# Patient Record
Sex: FEMALE | Race: WHITE | NOT HISPANIC OR LATINO | Employment: FULL TIME | URBAN - METROPOLITAN AREA
[De-identification: names, ages, dates, MRNs, and addresses within clinical notes are randomized per-mention and may not be internally consistent; named-entity substitution may affect disease eponyms.]

---

## 2023-02-09 ENCOUNTER — EH NON-PROVIDER (OUTPATIENT)
Dept: OCCUPATIONAL MEDICINE | Facility: CLINIC | Age: 23
End: 2023-02-09

## 2023-02-09 ENCOUNTER — EMPLOYEE HEALTH (OUTPATIENT)
Dept: OCCUPATIONAL MEDICINE | Facility: CLINIC | Age: 23
End: 2023-02-09

## 2023-02-09 ENCOUNTER — HOSPITAL ENCOUNTER (OUTPATIENT)
Facility: MEDICAL CENTER | Age: 23
End: 2023-02-09
Attending: PREVENTIVE MEDICINE
Payer: COMMERCIAL

## 2023-02-09 DIAGNOSIS — Z02.1 PRE-EMPLOYMENT DRUG SCREENING: ICD-10-CM

## 2023-02-09 DIAGNOSIS — Z02.89 ENCOUNTER FOR OCCUPATIONAL HEALTH ASSESSMENT: ICD-10-CM

## 2023-02-09 LAB
AMP AMPHETAMINE: NORMAL
BAR BARBITURATES: NORMAL
BZO BENZODIAZEPINES: NORMAL
COC COCAINE: NORMAL
INT CON NEG: NORMAL
INT CON POS: NORMAL
MDMA ECSTASY: NORMAL
MET METHAMPHETAMINES: NORMAL
MTD METHADONE: NORMAL
OPI OPIATES: NORMAL
OXY OXYCODONE: NORMAL
PCP PHENCYCLIDINE: NORMAL
POC URINE DRUG SCREEN OCDRS: NEGATIVE
THC: NORMAL

## 2023-02-09 PROCEDURE — 94375 RESPIRATORY FLOW VOLUME LOOP: CPT | Performed by: NURSE PRACTITIONER

## 2023-02-09 PROCEDURE — 86480 TB TEST CELL IMMUN MEASURE: CPT | Performed by: NURSE PRACTITIONER

## 2023-02-09 PROCEDURE — 80305 DRUG TEST PRSMV DIR OPT OBS: CPT | Performed by: NURSE PRACTITIONER

## 2023-02-09 PROCEDURE — 8915 PR COMPREHENSIVE PHYSICAL: Performed by: PREVENTIVE MEDICINE

## 2023-02-10 LAB
GAMMA INTERFERON BACKGROUND BLD IA-ACNC: 0.03 IU/ML
M TB IFN-G BLD-IMP: NEGATIVE
M TB IFN-G CD4+ BCKGRND COR BLD-ACNC: 0 IU/ML
MITOGEN IGNF BCKGRD COR BLD-ACNC: >10 IU/ML
QFT TB2 - NIL TBQ2: 0 IU/ML

## 2023-02-17 ENCOUNTER — EH NON-PROVIDER (OUTPATIENT)
Dept: OCCUPATIONAL MEDICINE | Facility: CLINIC | Age: 23
End: 2023-02-17

## 2023-04-15 ENCOUNTER — OFFICE VISIT (OUTPATIENT)
Dept: URGENT CARE | Facility: CLINIC | Age: 23
End: 2023-04-15
Payer: COMMERCIAL

## 2023-04-15 ENCOUNTER — HOSPITAL ENCOUNTER (OUTPATIENT)
Facility: MEDICAL CENTER | Age: 23
End: 2023-04-15
Payer: COMMERCIAL

## 2023-04-15 VITALS
HEIGHT: 67 IN | DIASTOLIC BLOOD PRESSURE: 58 MMHG | HEART RATE: 97 BPM | SYSTOLIC BLOOD PRESSURE: 110 MMHG | TEMPERATURE: 96.8 F | RESPIRATION RATE: 16 BRPM | WEIGHT: 143 LBS | BODY MASS INDEX: 22.44 KG/M2 | OXYGEN SATURATION: 99 %

## 2023-04-15 DIAGNOSIS — B37.31 VULVOVAGINAL CANDIDIASIS: ICD-10-CM

## 2023-04-15 DIAGNOSIS — N89.8 FOUL SMELLING VAGINAL DISCHARGE: ICD-10-CM

## 2023-04-15 LAB
APPEARANCE UR: NORMAL
BILIRUB UR STRIP-MCNC: NEGATIVE MG/DL
COLOR UR AUTO: NORMAL
GLUCOSE UR STRIP.AUTO-MCNC: NEGATIVE MG/DL
KETONES UR STRIP.AUTO-MCNC: NEGATIVE MG/DL
LEUKOCYTE ESTERASE UR QL STRIP.AUTO: NORMAL
NITRITE UR QL STRIP.AUTO: NEGATIVE
PH UR STRIP.AUTO: 5.5 [PH] (ref 5–8)
POCT INT CON NEG: NEGATIVE
POCT INT CON POS: POSITIVE
POCT URINE PREGNANCY TEST: NEGATIVE
PROT UR QL STRIP: NEGATIVE MG/DL
RBC UR QL AUTO: NEGATIVE
SP GR UR STRIP.AUTO: 1.03
UROBILINOGEN UR STRIP-MCNC: 0.2 MG/DL

## 2023-04-15 PROCEDURE — 87491 CHLMYD TRACH DNA AMP PROBE: CPT

## 2023-04-15 PROCEDURE — 87591 N.GONORRHOEAE DNA AMP PROB: CPT

## 2023-04-15 PROCEDURE — 81002 URINALYSIS NONAUTO W/O SCOPE: CPT

## 2023-04-15 PROCEDURE — 87660 TRICHOMONAS VAGIN DIR PROBE: CPT

## 2023-04-15 PROCEDURE — 87510 GARDNER VAG DNA DIR PROBE: CPT

## 2023-04-15 PROCEDURE — 81025 URINE PREGNANCY TEST: CPT

## 2023-04-15 PROCEDURE — 87086 URINE CULTURE/COLONY COUNT: CPT

## 2023-04-15 PROCEDURE — 99203 OFFICE O/P NEW LOW 30 MIN: CPT

## 2023-04-15 PROCEDURE — 87480 CANDIDA DNA DIR PROBE: CPT

## 2023-04-15 RX ORDER — LEVOTHYROXINE SODIUM 0.05 MG/1
50 TABLET ORAL
COMMUNITY

## 2023-04-15 RX ORDER — METRONIDAZOLE 500 MG/1
500 TABLET ORAL 2 TIMES DAILY
Qty: 14 TABLET | Refills: 0 | Status: SHIPPED | OUTPATIENT
Start: 2023-04-15 | End: 2023-04-22

## 2023-04-15 RX ORDER — FLUCONAZOLE 150 MG/1
150 TABLET ORAL DAILY
Qty: 1 TABLET | Refills: 1 | Status: SHIPPED | OUTPATIENT
Start: 2023-04-15 | End: 2023-05-01

## 2023-04-15 ASSESSMENT — ENCOUNTER SYMPTOMS
CHILLS: 0
VOMITING: 0
NAUSEA: 0
FLANK PAIN: 0
ABDOMINAL PAIN: 0
FEVER: 0

## 2023-04-15 NOTE — LETTER
April 15, 2023    To Whom It May Concern:         This is confirmation that Blanca DOUGLAS Yuki attended her scheduled appointment with XIOMARA Balbuena on 4/15/23. Please excuse her from work 4/16/23.         If you have any questions please do not hesitate to call me at the phone number listed below.    Sincerely,          DARLING Balbuena.  [phone]

## 2023-04-16 DIAGNOSIS — N89.8 FOUL SMELLING VAGINAL DISCHARGE: ICD-10-CM

## 2023-04-16 LAB
CANDIDA DNA VAG QL PROBE+SIG AMP: POSITIVE
G VAGINALIS DNA VAG QL PROBE+SIG AMP: POSITIVE
T VAGINALIS DNA VAG QL PROBE+SIG AMP: NEGATIVE

## 2023-04-16 NOTE — PROGRESS NOTES
"Subjective:   Blanca Mirza is a 23 y.o. female who presents for Vaginal Pain (X 2 DAYS, VAGINAL DISCOMFORT  )      HPI: This is a 23-year-old female who presents today for vaginal discomfort and discharge.  Patient reports symptoms developed 2 days ago.  Patient reports foul-smelling vaginal discharge.  She reports associated vaginal itching.  She has not taken anything for her symptoms.  She is sexually active with 1 partner.  She denies abdominal pain, pelvic pain, fevers, chills, body aches.  She denies urinary symptoms.        Review of Systems   Constitutional:  Negative for chills, fever and malaise/fatigue.   Gastrointestinal:  Negative for abdominal pain, nausea and vomiting.   Genitourinary:  Negative for dysuria, flank pain, frequency, hematuria and urgency.     Medications:    Current Outpatient Medications on File Prior to Visit   Medication Sig Dispense Refill    levothyroxine (SYNTHROID) 50 MCG Tab Take 50 mcg by mouth every morning on an empty stomach.      sertraline (ZOLOFT) 50 MG Tab Take 75 mg by mouth every day.      PREBIOTIC PRODUCT PO Take  by mouth.      Probiotic Product (PROBIOTIC-10 PO) Take  by mouth.       No current facility-administered medications on file prior to visit.        Allergies:   Patient has no known allergies.    Problem List:   There is no problem list on file for this patient.       Surgical History:  No past surgical history on file.    Past Social Hx:   Social History     Tobacco Use    Smoking status: Never    Smokeless tobacco: Never   Vaping Use    Vaping Use: Never used   Substance Use Topics    Alcohol use: Yes     Alcohol/week: 2.4 oz     Types: 4 Standard drinks or equivalent per week    Drug use: Never          Problem list, medications, and allergies reviewed by myself today in Epic.     Objective:     Resp 16   Ht 1.702 m (5' 7\")   Wt 64.9 kg (143 lb)   BMI 22.40 kg/m²     Physical Exam  Vitals and nursing note reviewed.   Constitutional:       General: " She is not in acute distress.     Appearance: Normal appearance. She is normal weight. She is not ill-appearing, toxic-appearing or diaphoretic.   HENT:      Head: Normocephalic and atraumatic.   Cardiovascular:      Rate and Rhythm: Normal rate and regular rhythm.      Pulses: Normal pulses.      Heart sounds: Normal heart sounds. No murmur heard.    No friction rub. No gallop.   Pulmonary:      Effort: Pulmonary effort is normal. No respiratory distress.      Breath sounds: Normal breath sounds. No stridor. No wheezing, rhonchi or rales.   Chest:      Chest wall: No tenderness.   Genitourinary:     Comments: Deferred  Skin:     General: Skin is warm and dry.      Capillary Refill: Capillary refill takes less than 2 seconds.   Neurological:      General: No focal deficit present.      Mental Status: She is alert and oriented to person, place, and time. Mental status is at baseline.      Gait: Gait normal.   Psychiatric:         Mood and Affect: Mood normal.         Behavior: Behavior normal.         Thought Content: Thought content normal.         Judgment: Judgment normal.       Assessment/Plan:     1. Vulvovaginal candidiasis  fluconazole (DIFLUCAN) 150 MG tablet      2. Foul smelling vaginal discharge  POCT Urinalysis    POCT PREGNANCY    URINE CULTURE(NEW)    VAGINAL PATHOGENS DNA PANEL    Chlamydia/GC, PCR (Genital/Anal swab)    metroNIDAZOLE (FLAGYL) 500 MG Tab         Results for orders placed or performed in visit on 04/15/23   POCT Urinalysis   Result Value Ref Range    POC Color Dark Yellow Negative    POC Appearance Cloudy Negative    POC Glucose Negative Negative mg/dL    POC Bilirubin Negative Negative mg/dL    POC Ketones Negative Negative mg/dL    POC Specific Gravity 1.030 <1.005 - >1.030    POC Blood Negative Negative    POC Urine PH 5.5 5.0 - 8.0    POC Protein Negative Negative mg/dL    POC Urobiligen 0.2 Negative (0.2) mg/dL    POC Nitrites Negative Negative    POC Leukocyte Esterase Small  Negative   POCT PREGNANCY   Result Value Ref Range    POC Urine Pregnancy Test Negative     Internal Control Positive Positive     Internal Control Negative Negative           Comments/MDM:   Pt is clinically stable at today's acute urgent care visit.  No acute distress noted. Appropriate for outpatient management at this time.   Acute problem.  Patient presents today for vaginal discharge, vaginal itching, and foul-smelling vaginal discharge.  Patient will be treated empirically for bacterial vaginosis and vulvovaginal candidiasis with 7-day course of metronidazole and one-time dose of Diflucan.  Vaginal pathogen swab and GC chlamydia swab obtained today, will send to lab and follow-up.  Urine will be sent for culture, will follow. Patient is to return to UC or go to ER for any new or worsening signs or symptoms, and follow with with PCP for recheck. Patient is agreeable with plan of care and verbalizes good understanding.             Discussed DDx, management options (risks,benefits, and alternatives to planned treatment), natural progression and supportive care.  Expressed understanding and the treatment plan was agreed upon. Questions were encouraged and answered   Return to urgent care prn if new or worsening sx or if there is no improvement in condition prn.    Educated in Red flags and indications to immediately call 911 or present to the Emergency Department.   Advised the patient to follow-up with the primary care physician for recheck, reevaluation, and consideration of further management.    I personally reviewed prior external notes and test results pertinent to today's visit.  I have independently reviewed and interpreted all diagnostics ordered during this urgent care acute visit.         Please note that this dictation was created using voice recognition software. I have made a reasonable attempt to correct obvious errors, but I expect that there are errors of grammar and possibly content that I did  not discover before finalizing the note.    This note was electronically signed by FERNANDO Smith

## 2023-04-18 LAB
BACTERIA UR CULT: NORMAL
C TRACH DNA GENITAL QL NAA+PROBE: NEGATIVE
N GONORRHOEA DNA GENITAL QL NAA+PROBE: NEGATIVE
SIGNIFICANT IND 70042: NORMAL
SITE SITE: NORMAL
SOURCE SOURCE: NORMAL
SPECIMEN SOURCE: NORMAL

## 2023-04-30 ENCOUNTER — APPOINTMENT (OUTPATIENT)
Dept: RADIOLOGY | Facility: MEDICAL CENTER | Age: 23
End: 2023-04-30
Attending: EMERGENCY MEDICINE
Payer: COMMERCIAL

## 2023-04-30 ENCOUNTER — HOSPITAL ENCOUNTER (EMERGENCY)
Facility: MEDICAL CENTER | Age: 23
End: 2023-04-30
Attending: EMERGENCY MEDICINE
Payer: COMMERCIAL

## 2023-04-30 VITALS
HEART RATE: 92 BPM | RESPIRATION RATE: 18 BRPM | OXYGEN SATURATION: 97 % | WEIGHT: 144.62 LBS | BODY MASS INDEX: 22.7 KG/M2 | SYSTOLIC BLOOD PRESSURE: 110 MMHG | TEMPERATURE: 98 F | DIASTOLIC BLOOD PRESSURE: 55 MMHG | HEIGHT: 67 IN

## 2023-04-30 DIAGNOSIS — J02.9 PHARYNGITIS, UNSPECIFIED ETIOLOGY: ICD-10-CM

## 2023-04-30 LAB
ANION GAP SERPL CALC-SCNC: 11 MMOL/L (ref 7–16)
BASOPHILS # BLD AUTO: 0.2 % (ref 0–1.8)
BASOPHILS # BLD: 0.03 K/UL (ref 0–0.12)
BUN SERPL-MCNC: 15 MG/DL (ref 8–22)
CALCIUM SERPL-MCNC: 9 MG/DL (ref 8.5–10.5)
CHLORIDE SERPL-SCNC: 102 MMOL/L (ref 96–112)
CO2 SERPL-SCNC: 24 MMOL/L (ref 20–33)
CREAT SERPL-MCNC: 0.63 MG/DL (ref 0.5–1.4)
EOSINOPHIL # BLD AUTO: 0.04 K/UL (ref 0–0.51)
EOSINOPHIL NFR BLD: 0.3 % (ref 0–6.9)
ERYTHROCYTE [DISTWIDTH] IN BLOOD BY AUTOMATED COUNT: 42.3 FL (ref 35.9–50)
GFR SERPLBLD CREATININE-BSD FMLA CKD-EPI: 128 ML/MIN/1.73 M 2
GLUCOSE SERPL-MCNC: 111 MG/DL (ref 65–99)
HCG SERPL QL: NEGATIVE
HCT VFR BLD AUTO: 42.3 % (ref 37–47)
HGB BLD-MCNC: 14 G/DL (ref 12–16)
IMM GRANULOCYTES # BLD AUTO: 0.07 K/UL (ref 0–0.11)
IMM GRANULOCYTES NFR BLD AUTO: 0.5 % (ref 0–0.9)
LYMPHOCYTES # BLD AUTO: 1.22 K/UL (ref 1–4.8)
LYMPHOCYTES NFR BLD: 8.9 % (ref 22–41)
MCH RBC QN AUTO: 31.2 PG (ref 27–33)
MCHC RBC AUTO-ENTMCNC: 33.1 G/DL (ref 33.6–35)
MCV RBC AUTO: 94.2 FL (ref 81.4–97.8)
MONOCYTES # BLD AUTO: 0.63 K/UL (ref 0–0.85)
MONOCYTES NFR BLD AUTO: 4.6 % (ref 0–13.4)
NEUTROPHILS # BLD AUTO: 11.65 K/UL (ref 2–7.15)
NEUTROPHILS NFR BLD: 85.5 % (ref 44–72)
NRBC # BLD AUTO: 0 K/UL
NRBC BLD-RTO: 0 /100 WBC
PLATELET # BLD AUTO: 176 K/UL (ref 164–446)
PMV BLD AUTO: 9.6 FL (ref 9–12.9)
POTASSIUM SERPL-SCNC: 3.9 MMOL/L (ref 3.6–5.5)
RBC # BLD AUTO: 4.49 M/UL (ref 4.2–5.4)
S PYO DNA SPEC NAA+PROBE: NOT DETECTED
SODIUM SERPL-SCNC: 137 MMOL/L (ref 135–145)
WBC # BLD AUTO: 13.6 K/UL (ref 4.8–10.8)

## 2023-04-30 PROCEDURE — 700111 HCHG RX REV CODE 636 W/ 250 OVERRIDE (IP): Performed by: EMERGENCY MEDICINE

## 2023-04-30 PROCEDURE — 70491 CT SOFT TISSUE NECK W/DYE: CPT

## 2023-04-30 PROCEDURE — 87651 STREP A DNA AMP PROBE: CPT

## 2023-04-30 PROCEDURE — 96375 TX/PRO/DX INJ NEW DRUG ADDON: CPT

## 2023-04-30 PROCEDURE — 80048 BASIC METABOLIC PNL TOTAL CA: CPT

## 2023-04-30 PROCEDURE — 84703 CHORIONIC GONADOTROPIN ASSAY: CPT

## 2023-04-30 PROCEDURE — 99284 EMERGENCY DEPT VISIT MOD MDM: CPT

## 2023-04-30 PROCEDURE — 36415 COLL VENOUS BLD VENIPUNCTURE: CPT

## 2023-04-30 PROCEDURE — 85025 COMPLETE CBC W/AUTO DIFF WBC: CPT

## 2023-04-30 PROCEDURE — A9270 NON-COVERED ITEM OR SERVICE: HCPCS | Performed by: EMERGENCY MEDICINE

## 2023-04-30 PROCEDURE — 700117 HCHG RX CONTRAST REV CODE 255: Performed by: EMERGENCY MEDICINE

## 2023-04-30 PROCEDURE — 96374 THER/PROPH/DIAG INJ IV PUSH: CPT

## 2023-04-30 PROCEDURE — 700102 HCHG RX REV CODE 250 W/ 637 OVERRIDE(OP): Performed by: EMERGENCY MEDICINE

## 2023-04-30 RX ORDER — OXYCODONE HYDROCHLORIDE 5 MG/1
5 TABLET ORAL ONCE
Status: COMPLETED | OUTPATIENT
Start: 2023-04-30 | End: 2023-04-30

## 2023-04-30 RX ORDER — HYDROCODONE BITARTRATE AND ACETAMINOPHEN 5; 325 MG/1; MG/1
1 TABLET ORAL EVERY 4 HOURS PRN
Qty: 10 TABLET | Refills: 0 | Status: SHIPPED | OUTPATIENT
Start: 2023-04-30 | End: 2023-05-02

## 2023-04-30 RX ORDER — ONDANSETRON 2 MG/ML
4 INJECTION INTRAMUSCULAR; INTRAVENOUS ONCE
Status: COMPLETED | OUTPATIENT
Start: 2023-04-30 | End: 2023-04-30

## 2023-04-30 RX ADMIN — OXYCODONE 5 MG: 5 TABLET ORAL at 06:31

## 2023-04-30 RX ADMIN — IOHEXOL 80 ML: 350 INJECTION, SOLUTION INTRAVENOUS at 05:45

## 2023-04-30 RX ADMIN — LIDOCAINE HYDROCHLORIDE 15 ML: 20 SOLUTION OROPHARYNGEAL at 06:32

## 2023-04-30 RX ADMIN — FENTANYL CITRATE 50 MCG: 50 INJECTION, SOLUTION INTRAMUSCULAR; INTRAVENOUS at 04:29

## 2023-04-30 RX ADMIN — ONDANSETRON 4 MG: 2 INJECTION INTRAMUSCULAR; INTRAVENOUS at 04:26

## 2023-04-30 ASSESSMENT — PAIN DESCRIPTION - PAIN TYPE
TYPE: ACUTE PAIN

## 2023-04-30 NOTE — ED NOTES
Pt rounded with unlabored respirations. Vital signs is stable. Start to complain again of pain in throat. . Gurney in low position, side rail up for pt safety. Call light within reach. Will continue to monitor for any complications.

## 2023-04-30 NOTE — ED NOTES
Pt ambulatory back to room Blue 17 with steady gait. Pt placed into gown and placed on the monitor.  Pt complaints severe sore throat  since yesterday and nauseated today.    Alert and oriented not in any form of acute respiratory distress.

## 2023-04-30 NOTE — ED TRIAGE NOTES
"Chief Complaint   Patient presents with    Sore Throat     PATIENT STATES PAIN ON THE RIGHT SIDE OF THROAT, PT STATES PAIN STARTED AROUND 1800 LAST NIGHT. PT TOOK MOTRIN AND TYLENOL WITH NO RELIEF.  PATIENT STATES IT IS HARD TO SWALLOW.            PT AMBULATORY  TO TRIAGE. Pt AA&O X 4, SEE ABOVE.     PT TO LOBBY . PT EDUCATED ON ALERTING STAFF TO CHANGES IN CONDITION. PT VERBALIZED AN UNDERSTANDING.     /64   Pulse 65   Temp 36.6 °C (97.8 °F) (Temporal)   Resp 20   Ht 1.702 m (5' 7\")   Wt 65.6 kg (144 lb 10 oz)   SpO2 99%   BMI 22.65 kg/m²     "

## 2023-04-30 NOTE — ED NOTES
Checked on bed, connected to monitor,  with unlabored respirations. Vital signs is stable. Denied any new complaints. Gurney in low position, side rail up for pt safety. Call light within reach. Will continue to monitor for any complications..

## 2023-04-30 NOTE — ED PROVIDER NOTES
"ED Provider Note    CHIEF COMPLAINT  Chief Complaint   Patient presents with    Sore Throat     PATIENT STATES PAIN ON THE RIGHT SIDE OF THROAT, PT STATES PAIN STARTED AROUND 1800 LAST NIGHT. PT TOOK MOTRIN AND TYLENOL WITH NO RELIEF.  PATIENT STATES IT IS HARD TO SWALLOW.        EXTERNAL RECORDS REVIEWED  Outpatient Notes office visit April 15, 2023 for vaginal discharge diagnosed with candidal yeast infection    HPI/ROS  LIMITATION TO HISTORY   Select: : None  OUTSIDE HISTORIAN(S):  Significant other at bedside    Blanca Mirza is a 23 y.o. female who presents to the emergency department with roughly 10 hours of sore throat with right-sided predominance.  Past medical history significant only for hypothyroidism on thyroid supplementation.  Additionally on antidepressant.    This evening had relatively quick onset of right-sided sore throat feeling deep in nature.  Unrelenting pain at least moderate severity.  She took 3 doses of 400 mg of ibuprofen as well as a few doses of Tylenol with no pain abatement.  Increased pain with swallowing.    Does note that she had \"aggressive oral sex earlier in the day \".  (Yesterday)    Denies any other symptomatology.  Does have a history of strep throat.    PAST MEDICAL HISTORY   has a past medical history of Hypothyroid.    SURGICAL HISTORY  patient denies any surgical history    FAMILY HISTORY  History reviewed. No pertinent family history.    SOCIAL HISTORY  Social History     Tobacco Use    Smoking status: Never    Smokeless tobacco: Never   Vaping Use    Vaping Use: Never used   Substance and Sexual Activity    Alcohol use: Yes     Alcohol/week: 2.4 oz     Types: 4 Standard drinks or equivalent per week    Drug use: Never    Sexual activity: Not on file       CURRENT MEDICATIONS  Home Medications       Reviewed by Mary Morin R.N. (Registered Nurse) on 04/30/23 at 0309  Med List Status: Not Addressed     Medication Last Dose Status   fluconazole (DIFLUCAN) 150 MG " "tablet  Active   levothyroxine (SYNTHROID) 50 MCG Tab  Active   PREBIOTIC PRODUCT PO  Active   Probiotic Product (PROBIOTIC-10 PO)  Active   sertraline (ZOLOFT) 50 MG Tab  Active                    ALLERGIES  No Known Allergies    PHYSICAL EXAM  VITAL SIGNS: /52   Pulse 82   Temp 36.9 °C (98.4 °F) (Temporal)   Resp 18   Ht 1.702 m (5' 7\")   Wt 65.6 kg (144 lb 10 oz)   SpO2 98%   BMI 22.65 kg/m²      Pulse ox interpretation: I interpret this pulse ox as normal.  Constitutional: Alert in no apparent distress.  HENT: No signs of trauma, Bilateral external ears normal, Nose normal.   Eyes: Pupils are equal and reactive  Neck: Normal range of motion, No tenderness, Supple  Cardiovascular: Regular rate and rhythm, no murmurs.   Thorax & Lungs: Normal breath sounds, No respiratory distress, No wheezing, No chest tenderness.   Abdomen: Bowel sounds normal, Soft, No tenderness  Skin: Warm, Dry, No erythema, No rash.   Extremities: Intact distal pulses, No edema, No tenderness  Musculoskeletal: Good range of motion in all major joints. No tenderness to palpation or major deformities noted.   Neurologic: Alert , Normal motor function, Normal sensory function, No focal deficits noted.   Psychiatric: Affect normal, Judgment normal, Mood normal.         DIAGNOSTIC STUDIES / PROCEDURES    LABS  Results for orders placed or performed during the hospital encounter of 04/30/23   CBC WITH DIFFERENTIAL   Result Value Ref Range    WBC 13.6 (H) 4.8 - 10.8 K/uL    RBC 4.49 4.20 - 5.40 M/uL    Hemoglobin 14.0 12.0 - 16.0 g/dL    Hematocrit 42.3 37.0 - 47.0 %    MCV 94.2 81.4 - 97.8 fL    MCH 31.2 27.0 - 33.0 pg    MCHC 33.1 (L) 33.6 - 35.0 g/dL    RDW 42.3 35.9 - 50.0 fL    Platelet Count 176 164 - 446 K/uL    MPV 9.6 9.0 - 12.9 fL    Neutrophils-Polys 85.50 (H) 44.00 - 72.00 %    Lymphocytes 8.90 (L) 22.00 - 41.00 %    Monocytes 4.60 0.00 - 13.40 %    Eosinophils 0.30 0.00 - 6.90 %    Basophils 0.20 0.00 - 1.80 %    Immature " Granulocytes 0.50 0.00 - 0.90 %    Nucleated RBC 0.00 /100 WBC    Neutrophils (Absolute) 11.65 (H) 2.00 - 7.15 K/uL    Lymphs (Absolute) 1.22 1.00 - 4.80 K/uL    Monos (Absolute) 0.63 0.00 - 0.85 K/uL    Eos (Absolute) 0.04 0.00 - 0.51 K/uL    Baso (Absolute) 0.03 0.00 - 0.12 K/uL    Immature Granulocytes (abs) 0.07 0.00 - 0.11 K/uL    NRBC (Absolute) 0.00 K/uL   BASIC METABOLIC PANEL   Result Value Ref Range    Sodium 137 135 - 145 mmol/L    Potassium 3.9 3.6 - 5.5 mmol/L    Chloride 102 96 - 112 mmol/L    Co2 24 20 - 33 mmol/L    Glucose 111 (H) 65 - 99 mg/dL    Bun 15 8 - 22 mg/dL    Creatinine 0.63 0.50 - 1.40 mg/dL    Calcium 9.0 8.5 - 10.5 mg/dL    Anion Gap 11.0 7.0 - 16.0   HCG Qual Serum   Result Value Ref Range    Beta-Hcg Qualitative Serum Negative Negative   Group A Strep by PCR    Specimen: Throat   Result Value Ref Range    Group A Strep by PCR Not Detected Not Detected   ESTIMATED GFR   Result Value Ref Range    GFR (CKD-EPI) 128 >60 mL/min/1.73 m 2       In prescribing controlled substances to this patient, I certify that I have obtained and reviewed the medical history of Blanca Mirza. I have also made a good yajaira effort to obtain applicable records from other providers who have treated the patient and records did not demonstrate any increased risk of substance abuse that would prevent me from prescribing controlled substances.     I have conducted a physical exam and documented it. I have reviewed Ms. Mirza’s prescription history as maintained by the [place] Prescription Monitoring Program.     I have assessed the patient’s risk for abuse, dependency, and addiction using the validated Opioid Risk Tool available at https://www.mdcalc.com/fbwyzc-rfgd-cndp-ort-narcotic-abuse.     Given the above, I believe the benefits of controlled substance therapy outweigh the risks. The reasons for prescribing controlled substances include non-narcotic, oral analgesic alternatives have been inadequate for pain  control. Accordingly, I have discussed the risk and benefits, treatment plan, and alternative therapies with the patient.     RADIOLOGY  I have independently interpreted the diagnostic imaging associated with this visit and am waiting the final reading from the radiologist.   My preliminary interpretation is as follows: CT: No obvious deep space infection  Radiologist interpretation:   CT-SOFT TISSUE NECK WITH   Final Result      CT of the neck soft tissues with contrast within normal limits.            COURSE & MEDICAL DECISION MAKING    ED Observation Status? No; Patient does not meet criteria for ED Observation.     INITIAL ASSESSMENT, COURSE AND PLAN  Care Narrative: 21-year-old female presented emerged department with sore throat.  History as above.  We will proceed with hematologic and imaging work-up.  No significant abnormalities on initial physical exam.    DISPOSITION AND DISCUSSIONS  I have discussed management of the patient with the following physicians and AUGUSTIN's: None    Discussion of management with other [facility] or appropriate source(s): Pharmacy for medication verification      Escalation of care considered, and ultimately not performed:acute inpatient care management, however at this time, the patient is most appropriate for outpatient management    Barriers to care at this time, including but not limited to: Patient does not have established PCP.     Decision tools and prescription drugs considered including, but not limited to: Antibiotics not indicated and Pain Medications provided .    23-year-old female presented to the emergency department with onset of sore throat.  Chart review revealed that the patient was recently worked up for vaginal discharge.  STD screening was negative.  Additional causative considerations to include strep throat or viral pharyngitis.  Additional traumatic pharyngitis may be a consideration as well given oral sex prior to onset.  strep testing is negative.  Hematologic  work-up is largely benign other than minimal elevation in white count.  CT imaging does not show any acute abnormalities.    At this point the patient has received a few doses of pain medication.  She will be provided with additional Norco prior to departure and will be prescribed the same for home.  She is to follow-up with outpatient PCP and will return to the ER with any change or worsening.  She is understanding of diet to follow.    FINAL DIAGNOSIS  1. Pharyngitis, unspecified etiology           Electronically signed by: Archie Katz M.D., 4/30/2023 4:19 AM

## 2023-04-30 NOTE — ED NOTES
Pt discharged to home. Discharge paperwork provided. Education provided by ERP.  Pt was given follow up instructions and prescriptions. Pt verbalized understanding of all instructions for discharge. Patient ambulatory, alert and oriented x 4, out of ER with all belongings and steady gait.

## 2023-05-01 ENCOUNTER — HOSPITAL ENCOUNTER (OUTPATIENT)
Facility: MEDICAL CENTER | Age: 23
End: 2023-05-01
Attending: NURSE PRACTITIONER
Payer: COMMERCIAL

## 2023-05-01 ENCOUNTER — OFFICE VISIT (OUTPATIENT)
Dept: URGENT CARE | Facility: CLINIC | Age: 23
End: 2023-05-01
Payer: COMMERCIAL

## 2023-05-01 VITALS
HEIGHT: 67 IN | DIASTOLIC BLOOD PRESSURE: 68 MMHG | HEART RATE: 94 BPM | TEMPERATURE: 98.6 F | RESPIRATION RATE: 14 BRPM | SYSTOLIC BLOOD PRESSURE: 102 MMHG | OXYGEN SATURATION: 98 % | WEIGHT: 145 LBS | BODY MASS INDEX: 22.76 KG/M2

## 2023-05-01 DIAGNOSIS — J03.90 EXUDATIVE TONSILLITIS: ICD-10-CM

## 2023-05-01 DIAGNOSIS — J02.9 PHARYNGITIS, UNSPECIFIED ETIOLOGY: ICD-10-CM

## 2023-05-01 LAB
INT CON NEG: NORMAL
INT CON POS: NORMAL
S PYO AG THROAT QL: NEGATIVE

## 2023-05-01 PROCEDURE — 87880 STREP A ASSAY W/OPTIC: CPT | Performed by: NURSE PRACTITIONER

## 2023-05-01 PROCEDURE — 99213 OFFICE O/P EST LOW 20 MIN: CPT | Performed by: NURSE PRACTITIONER

## 2023-05-01 PROCEDURE — 87070 CULTURE OTHR SPECIMN AEROBIC: CPT

## 2023-05-01 PROCEDURE — 87077 CULTURE AEROBIC IDENTIFY: CPT

## 2023-05-01 RX ORDER — DEXAMETHASONE SODIUM PHOSPHATE 10 MG/ML
10 INJECTION INTRAMUSCULAR; INTRAVENOUS ONCE
Status: COMPLETED | OUTPATIENT
Start: 2023-05-01 | End: 2023-05-01

## 2023-05-01 RX ORDER — AMOXICILLIN 500 MG/1
500 CAPSULE ORAL 2 TIMES DAILY
Qty: 20 CAPSULE | Refills: 0 | Status: SHIPPED | OUTPATIENT
Start: 2023-05-01 | End: 2023-05-11

## 2023-05-01 RX ADMIN — DEXAMETHASONE SODIUM PHOSPHATE 10 MG: 10 INJECTION INTRAMUSCULAR; INTRAVENOUS at 19:33

## 2023-05-01 ASSESSMENT — ENCOUNTER SYMPTOMS
FEVER: 1
SWOLLEN GLANDS: 1
TROUBLE SWALLOWING: 1
COUGH: 0
CHILLS: 1
HEADACHES: 0
HOARSE VOICE: 1
SORE THROAT: 1

## 2023-05-02 DIAGNOSIS — J03.90 EXUDATIVE TONSILLITIS: ICD-10-CM

## 2023-05-02 DIAGNOSIS — J02.9 PHARYNGITIS, UNSPECIFIED ETIOLOGY: ICD-10-CM

## 2023-05-02 NOTE — PROGRESS NOTES
"Subjective:   Blanca Mirza is a 23 y.o. female who presents for Sore Throat (X 3 days with neck pain and low grade fever.  Pt. Was seen in ER on Sunday am. )      Pharyngitis   This is a new problem. Episode onset: 3 days; initially seen in the emergency room diagnosed with viral URI symptoms have worsened. The problem has been gradually worsening. Sore throat worse side: both. The pain is severe. Associated symptoms include a hoarse voice, swollen glands and trouble swallowing. Pertinent negatives include no congestion, coughing, drooling, ear discharge, ear pain, headaches or plugged ear sensation. She has had no exposure to strep or mono. She has tried acetaminophen for the symptoms. The treatment provided no relief.     Review of Systems   Constitutional:  Positive for chills and fever. Negative for malaise/fatigue.   HENT:  Positive for hoarse voice, sore throat and trouble swallowing. Negative for congestion, drooling, ear discharge and ear pain.    Respiratory:  Negative for cough.    Neurological:  Negative for headaches.     Medications:    amoxicillin Caps  HYDROcodone-acetaminophen Tabs  levothyroxine Tabs  PREBIOTIC PRODUCT PO  PROBIOTIC-10 PO  sertraline Tabs    Allergies: Patient has no known allergies.    Problem List: Blanca Mirza does not have a problem list on file.    Surgical History:  No past surgical history on file.    Past Social Hx: Blanca Mirza  reports that she has never smoked. She has never used smokeless tobacco. She reports current alcohol use of about 2.4 oz per week. She reports that she does not use drugs.     Past Family Hx:  Blanca Mirza family history is not on file.     Problem list, medications, and allergies reviewed by myself today in Epic.     Objective:     /68   Pulse 94   Temp 37 °C (98.6 °F) (Temporal)   Resp 14   Ht 1.702 m (5' 7\")   Wt 65.8 kg (145 lb)   SpO2 98%   BMI 22.71 kg/m²     Physical Exam  Vitals and nursing note reviewed.   Constitutional:       " General: She is not in acute distress.     Appearance: She is well-developed.   HENT:      Head: Normocephalic and atraumatic.      Right Ear: External ear normal.      Left Ear: External ear normal.      Nose: Nose normal.      Mouth/Throat:      Mouth: Mucous membranes are moist.      Pharynx: Posterior oropharyngeal erythema present.      Tonsils: Tonsillar exudate present. 2+ on the right. 2+ on the left.   Eyes:      Conjunctiva/sclera: Conjunctivae normal.   Cardiovascular:      Rate and Rhythm: Normal rate.   Pulmonary:      Effort: Pulmonary effort is normal. No respiratory distress.      Breath sounds: Normal breath sounds.   Abdominal:      General: There is no distension.   Musculoskeletal:         General: Normal range of motion.   Lymphadenopathy:      Cervical: Cervical adenopathy present.   Skin:     General: Skin is warm and dry.   Neurological:      General: No focal deficit present.      Mental Status: She is alert and oriented to person, place, and time. Mental status is at baseline.      Gait: Gait (gait at baseline) normal.   Psychiatric:         Judgment: Judgment normal.       Assessment/Plan:     Diagnosis and associated orders:     1. Exudative tonsillitis  amoxicillin (AMOXIL) 500 MG Cap    dexamethasone (DECADRON) injection (check route below) 10 mg    POCT Rapid Strep A    CULTURE THROAT      2. Pharyngitis, unspecified etiology  CULTURE THROAT           Comments/MDM:     I personally reviewed prior external notes and prior test results pertinent to today's visit.  Patient previously seen 4/30 in the emergency room strep a was negative on this exam and today's exam.  CT imaging 4/30 was negative.  Concerning infectious etiology she will be treated with Decadron and started on amoxicillin.  Throat culture obtained  Discussed management options, risks and benefits, and alternatives to treatment plan agreed upon.   Red flags discussed and indications to immediately call 911 or present to the  Emergency Department.   Supportive care, differential diagnoses, and indications for immediate follow-up discussed with patient.    Patient expresses understanding and agrees to plan. Patient denies any other questions or concerns.                Please note that this dictation was created using voice recognition software. I have made a reasonable attempt to correct obvious errors, but I expect that there are errors of grammar and possibly content that I did not discover before finalizing the note.    This note was electronically signed by Kenneth MADISON

## 2023-05-04 LAB
BACTERIA SPEC RESP CULT: NORMAL
SIGNIFICANT IND 70042: NORMAL
SITE SITE: NORMAL
SOURCE SOURCE: NORMAL

## 2023-07-31 RX ORDER — AMOXICILLIN 500 MG/1
CAPSULE ORAL
Qty: 20 CAPSULE | Refills: 0 | OUTPATIENT
Start: 2023-07-31

## 2023-09-23 ENCOUNTER — APPOINTMENT (OUTPATIENT)
Dept: URGENT CARE | Facility: CLINIC | Age: 23
End: 2023-09-23
Payer: COMMERCIAL

## 2023-09-24 ENCOUNTER — HOSPITAL ENCOUNTER (OUTPATIENT)
Facility: MEDICAL CENTER | Age: 23
End: 2023-09-24
Payer: COMMERCIAL

## 2023-09-24 ENCOUNTER — OFFICE VISIT (OUTPATIENT)
Dept: URGENT CARE | Facility: CLINIC | Age: 23
End: 2023-09-24
Payer: COMMERCIAL

## 2023-09-24 VITALS
HEIGHT: 67 IN | TEMPERATURE: 98 F | RESPIRATION RATE: 16 BRPM | BODY MASS INDEX: 22.54 KG/M2 | HEART RATE: 88 BPM | SYSTOLIC BLOOD PRESSURE: 110 MMHG | DIASTOLIC BLOOD PRESSURE: 62 MMHG | WEIGHT: 143.6 LBS | OXYGEN SATURATION: 100 %

## 2023-09-24 DIAGNOSIS — N76.0 ACUTE VAGINITIS: ICD-10-CM

## 2023-09-24 DIAGNOSIS — R39.9 UTI SYMPTOMS: ICD-10-CM

## 2023-09-24 PROBLEM — L70.0 SUPERFICIAL MIXED COMEDONAL AND INFLAMMATORY ACNE VULGARIS: Status: ACTIVE | Noted: 2021-11-15

## 2023-09-24 PROBLEM — R42 EPISODIC LIGHTHEADEDNESS: Status: ACTIVE | Noted: 2022-12-21

## 2023-09-24 PROBLEM — F51.01 PRIMARY INSOMNIA: Status: ACTIVE | Noted: 2023-02-01

## 2023-09-24 PROBLEM — E04.2 MULTIPLE THYROID NODULES: Status: ACTIVE | Noted: 2021-07-20

## 2023-09-24 PROBLEM — K20.0 EOSINOPHILIC ESOPHAGITIS: Status: ACTIVE | Noted: 2021-07-20

## 2023-09-24 PROBLEM — K52.9 COLITIS: Status: ACTIVE | Noted: 2021-07-20

## 2023-09-24 PROBLEM — G43.109 MIGRAINE WITH AURA AND WITHOUT STATUS MIGRAINOSUS, NOT INTRACTABLE: Status: ACTIVE | Noted: 2021-07-20

## 2023-09-24 PROBLEM — K58.0 IRRITABLE BOWEL SYNDROME WITH DIARRHEA: Status: ACTIVE | Noted: 2021-08-04

## 2023-09-24 PROBLEM — E03.9 ACQUIRED HYPOTHYROIDISM: Status: ACTIVE | Noted: 2021-07-20

## 2023-09-24 PROBLEM — L85.8 KERATOSIS PILARIS: Status: ACTIVE | Noted: 2021-07-20

## 2023-09-24 PROBLEM — E78.2 MIXED HYPERLIPIDEMIA: Status: ACTIVE | Noted: 2021-07-20

## 2023-09-24 PROBLEM — F41.1 GENERALIZED ANXIETY DISORDER: Status: ACTIVE | Noted: 2022-12-21

## 2023-09-24 LAB
APPEARANCE UR: NORMAL
BILIRUB UR STRIP-MCNC: NEGATIVE MG/DL
CANDIDA DNA VAG QL PROBE+SIG AMP: NEGATIVE
COLOR UR AUTO: NORMAL
G VAGINALIS DNA VAG QL PROBE+SIG AMP: POSITIVE
GLUCOSE UR STRIP.AUTO-MCNC: NEGATIVE MG/DL
KETONES UR STRIP.AUTO-MCNC: 15 MG/DL
LEUKOCYTE ESTERASE UR QL STRIP.AUTO: NEGATIVE
NITRITE UR QL STRIP.AUTO: NEGATIVE
PH UR STRIP.AUTO: 5.5 [PH] (ref 5–8)
POCT INT CON NEG: NEGATIVE
POCT INT CON POS: POSITIVE
POCT URINE PREGNANCY TEST: NEGATIVE
PROT UR QL STRIP: NORMAL MG/DL
RBC UR QL AUTO: NEGATIVE
SP GR UR STRIP.AUTO: 1.02
T VAGINALIS DNA VAG QL PROBE+SIG AMP: NEGATIVE
UROBILINOGEN UR STRIP-MCNC: 0.2 MG/DL

## 2023-09-24 PROCEDURE — 3074F SYST BP LT 130 MM HG: CPT

## 2023-09-24 PROCEDURE — 3078F DIAST BP <80 MM HG: CPT

## 2023-09-24 PROCEDURE — 87510 GARDNER VAG DNA DIR PROBE: CPT

## 2023-09-24 PROCEDURE — 99213 OFFICE O/P EST LOW 20 MIN: CPT

## 2023-09-24 PROCEDURE — 81002 URINALYSIS NONAUTO W/O SCOPE: CPT

## 2023-09-24 PROCEDURE — 87086 URINE CULTURE/COLONY COUNT: CPT

## 2023-09-24 PROCEDURE — 87077 CULTURE AEROBIC IDENTIFY: CPT

## 2023-09-24 PROCEDURE — 81025 URINE PREGNANCY TEST: CPT

## 2023-09-24 PROCEDURE — 87660 TRICHOMONAS VAGIN DIR PROBE: CPT

## 2023-09-24 PROCEDURE — 87480 CANDIDA DNA DIR PROBE: CPT

## 2023-09-24 RX ORDER — LEVONORGESTREL 19.5 MG/1
1 INTRAUTERINE DEVICE INTRAUTERINE
COMMUNITY

## 2023-09-24 NOTE — PROGRESS NOTES
Subjective:   Blanca Mirza is a 23 y.o. female who presents for Vaginal Pain (X2days burning sensation/frequent urination/vaginal gray discharge/)      HPI:    Patient presents urgent care with concerns of dysuria  Reports she has been having intermittent dysuria for couple weeks  She thought it was a UTI at first however it has not worsened over the last couple of weeks until 2 days ago   Denies hematuria, flank pain, abdominal pain, nausea vomiting, fever, chills  Reports vaginal discharge which is increased and is gray and there is is an odor  She describes odor as musty  Denies pain with intercourse  Denies concerns for STDs    ROS As above in HPI    Medications:    Current Outpatient Medications on File Prior to Visit   Medication Sig Dispense Refill    levothyroxine (SYNTHROID) 50 MCG Tab Take 50 mcg by mouth every morning on an empty stomach.      sertraline (ZOLOFT) 50 MG Tab Take 75 mg by mouth every day.      PREBIOTIC PRODUCT PO Take  by mouth.      Probiotic Product (PROBIOTIC-10 PO) Take  by mouth.      levonorgestrel (KYLEENA) 19.5 mg (17.5 mcg/24 hr) IUD 1 Each by Intrauterine route.       No current facility-administered medications on file prior to visit.        Allergies:   Patient has no known allergies.    Problem List:   Patient Active Problem List   Diagnosis    Acquired hypothyroidism    Colitis    Eosinophilic esophagitis    Episodic lightheadedness    Generalized anxiety disorder    Irritable bowel syndrome with diarrhea    Keratosis pilaris    Migraine with aura and without status migrainosus, not intractable    Mixed hyperlipidemia    Multiple thyroid nodules    Primary insomnia    Superficial mixed comedonal and inflammatory acne vulgaris        Surgical History:  No past surgical history on file.    Past Social Hx:   Social History     Tobacco Use    Smoking status: Never    Smokeless tobacco: Never   Vaping Use    Vaping Use: Never used   Substance Use Topics    Alcohol use: Yes      "Alcohol/week: 2.4 oz     Types: 4 Standard drinks or equivalent per week    Drug use: Never          Problem list, medications, and allergies reviewed by myself today in Epic.     Objective:     /62 (BP Location: Left arm, Patient Position: Sitting)   Pulse 88   Temp 36.7 °C (98 °F) (Temporal)   Resp 16   Ht 1.702 m (5' 7\")   Wt 65.1 kg (143 lb 9.6 oz)   LMP  (Exact Date)   SpO2 100%   BMI 22.49 kg/m²     Physical Exam  Vitals and nursing note reviewed.   Constitutional:       Appearance: Normal appearance.   HENT:      Head: Normocephalic and atraumatic.   Cardiovascular:      Rate and Rhythm: Normal rate and regular rhythm.      Heart sounds: Normal heart sounds.   Pulmonary:      Effort: Pulmonary effort is normal.      Breath sounds: Normal breath sounds.   Abdominal:      General: Bowel sounds are normal.      Palpations: Abdomen is soft.      Tenderness: There is no right CVA tenderness or left CVA tenderness.   Genitourinary:     Comments:  exam deferred  Neurological:      Mental Status: She is alert and oriented to person, place, and time.         Assessment/Plan:       Results for orders placed or performed in visit on 09/24/23   POCT Urinalysis   Result Value Ref Range    POC Color dark Yellow Negative    POC Appearance cloudy Negative    POC Glucose negative Negative mg/dL    POC Bilirubin negative Negative mg/dL    POC Ketones 15 Negative mg/dL    POC Specific Gravity 1.020 <1.005 - >1.030    POC Blood negative Negative    POC Urine PH 5.5 5.0 - 8.0    POC Protein trace Negative mg/dL    POC Urobiligen 0.2 Negative (0.2) mg/dL    POC Nitrites negative Negative    POC Leukocyte Esterase negative Negative       Diagnosis and associated orders:   1. UTI symptoms  - POCT Urinalysis  - URINE CULTURE(NEW); Future  - POCT Pregnancy    2. Acute vaginitis  - VAGINAL PATHOGENS DNA PANEL; Future  - Chlamydia/GC, PCR (Urine); Future    Other orders  - levonorgestrel (KYLEENA) 19.5 mg (17.5 mcg/24 " hr) IUD; 1 Each by Intrauterine route.        Comments/MDM:     UA: +ketones, +protein, -blood, -LE, -nitrites  Neg poc hcg  Urine culture ordered  Vaginal pathogen panel obtained, patient performed self swab  Gonorrhea/chlamydia also ordered  Patient agreeable to waiting for treatment until test results are available for review.  Will modify plan once results are available.  Advised to refrain from all forms of sexual activity and tested results are available, she has completed potential forms of treatment, and she is asymptomatic.  Return to  if symptoms fail to improve or worsen.   Patient verbalized understanding and consented to plan of care       Please note that this dictation was created using voice recognition software. I have made a reasonable attempt to correct obvious errors, but I expect that there are errors of grammar and possibly content that I did not discover before finalizing the note.    This note was electronically signed by FERNANDO Means

## 2023-09-25 ENCOUNTER — TELEPHONE (OUTPATIENT)
Dept: URGENT CARE | Facility: CLINIC | Age: 23
End: 2023-09-25
Payer: COMMERCIAL

## 2023-09-25 DIAGNOSIS — N76.0 BV (BACTERIAL VAGINOSIS): ICD-10-CM

## 2023-09-25 DIAGNOSIS — B96.89 BV (BACTERIAL VAGINOSIS): ICD-10-CM

## 2023-09-25 RX ORDER — METRONIDAZOLE 500 MG/1
500 TABLET ORAL 2 TIMES DAILY
Qty: 14 TABLET | Refills: 0 | Status: SHIPPED | OUTPATIENT
Start: 2023-09-25 | End: 2023-10-02

## 2023-09-26 ENCOUNTER — IMMUNIZATION (OUTPATIENT)
Dept: OCCUPATIONAL MEDICINE | Facility: CLINIC | Age: 23
End: 2023-09-26

## 2023-09-26 DIAGNOSIS — B95.1 GROUP B STREPTOCOCCAL UTI: ICD-10-CM

## 2023-09-26 DIAGNOSIS — Z23 NEED FOR VACCINATION: Primary | ICD-10-CM

## 2023-09-26 DIAGNOSIS — N39.0 GROUP B STREPTOCOCCAL UTI: ICD-10-CM

## 2023-09-26 LAB
BACTERIA UR CULT: ABNORMAL
BACTERIA UR CULT: ABNORMAL
SIGNIFICANT IND 70042: ABNORMAL
SITE SITE: ABNORMAL
SOURCE SOURCE: ABNORMAL

## 2023-09-26 PROCEDURE — 90686 IIV4 VACC NO PRSV 0.5 ML IM: CPT | Performed by: PREVENTIVE MEDICINE

## 2023-09-26 RX ORDER — AMOXICILLIN 500 MG/1
500 CAPSULE ORAL 3 TIMES DAILY
Qty: 21 CAPSULE | Refills: 0 | Status: SHIPPED | OUTPATIENT
Start: 2023-09-26 | End: 2023-10-03

## 2023-09-26 NOTE — TELEPHONE ENCOUNTER
Left voicemail on patient's phone requesting call back  Mychart message also sent  Vaginal pathogen panel is positive for gardnerella  Rx metronidazole sent to [facility].

## 2023-09-27 NOTE — PROGRESS NOTES
Patient presented to urgent care noting positive urine culture from several days ago from another provider.  Spoke with provider and sent a prescription for amoxicillin to patient's pharmacy to cover group B strep UTI.